# Patient Record
Sex: FEMALE | Race: WHITE | NOT HISPANIC OR LATINO | URBAN - METROPOLITAN AREA
[De-identification: names, ages, dates, MRNs, and addresses within clinical notes are randomized per-mention and may not be internally consistent; named-entity substitution may affect disease eponyms.]

---

## 2017-09-29 ENCOUNTER — APPOINTMENT (OUTPATIENT)
Age: 27
Setting detail: DERMATOLOGY
End: 2017-10-01

## 2017-09-29 VITALS — HEIGHT: 66 IN | WEIGHT: 150 LBS

## 2017-09-29 DIAGNOSIS — L71.8 OTHER ROSACEA: ICD-10-CM

## 2017-09-29 DIAGNOSIS — L57.8 OTHER SKIN CHANGES DUE TO CHRONIC EXPOSURE TO NONIONIZING RADIATION: ICD-10-CM

## 2017-09-29 PROBLEM — L29.8 OTHER PRURITUS: Status: ACTIVE | Noted: 2017-09-29

## 2017-09-29 PROBLEM — L30.9 DERMATITIS, UNSPECIFIED: Status: ACTIVE | Noted: 2017-09-29

## 2017-09-29 PROCEDURE — OTHER FOLLOW UP FOR NEXT VISIT: OTHER

## 2017-09-29 PROCEDURE — OTHER MIPS QUALITY: OTHER

## 2017-09-29 PROCEDURE — OTHER PRESCRIPTION: OTHER

## 2017-09-29 PROCEDURE — 99202 OFFICE O/P NEW SF 15 MIN: CPT

## 2017-09-29 PROCEDURE — OTHER TREATMENT REGIMEN: OTHER

## 2017-09-29 RX ORDER — DESONIDE 0.5 MG/G
THIN COAT CREAM TOPICAL DAILY
Qty: 1 | Refills: 1 | Status: ERX | COMMUNITY
Start: 2017-09-29 | End: 2017-10-27

## 2017-09-29 ASSESSMENT — LOCATION SIMPLE DESCRIPTION DERM
LOCATION SIMPLE: LEFT CHEEK
LOCATION SIMPLE: RIGHT CHEEK
LOCATION SIMPLE: LEFT FOREHEAD

## 2017-09-29 ASSESSMENT — LOCATION DETAILED DESCRIPTION DERM
LOCATION DETAILED: LEFT INFERIOR MEDIAL FOREHEAD
LOCATION DETAILED: RIGHT CENTRAL MALAR CHEEK
LOCATION DETAILED: LEFT INFERIOR CENTRAL MALAR CHEEK

## 2017-09-29 ASSESSMENT — SEVERITY ASSESSMENT OVERALL AMONG ALL PATIENTS
IN YOUR EXPERIENCE, AMONG ALL PATIENTS YOU HAVE SEEN WITH THIS CONDITION, HOW SEVERE IS THIS PATIENT'S CONDITION?: MILD TO MODERATE

## 2017-09-29 ASSESSMENT — LOCATION ZONE DERM: LOCATION ZONE: FACE

## 2017-09-29 NOTE — PROCEDURE: MIPS QUALITY
Quality 111:Pneumonia Vaccination Status For Older Adults: Pneumococcal Vaccination not Administered or Previously Received, Reason not Otherwise Specified
Quality 110: Preventive Care And Screening: Influenza Immunization: Influenza Immunization Ordered or Recommended, but not Administered due to system reason
Detail Level: Detailed
Quality 47: Advance Care Plan: Advance care planning not documented, reason not otherwise specified.

## 2017-09-29 NOTE — PROCEDURE: FOLLOW UP FOR NEXT VISIT
Scheduled For Follow Up In (Optional): 4-6 weeks
Detail Level: Simple
Instructions (Optional): I would like to see her back to check her progress. She will call if there are any problems with any of the medications

## 2017-09-29 NOTE — PROCEDURE: TREATMENT REGIMEN
Plan: She will need full body skin exam, I will discuss this with her at her follow up visit
Detail Level: Simple
Plan: There does appear to be a mixed component with both rosacea and possibly seborrheic dermatitis. Hopefully simplifying the lotion and face wash along with treatment for the rosacea will make a big difference. However, I am going to send in a prescription for desonide as well if she needs it for any flareups and then we will follow up in 4 to 6 weeks to see how she's doing
Samples Given: Soolantra QHS
Otc Regimen: Cerave Face wash and lotion only to simplify

## 2017-10-10 ENCOUNTER — RX ONLY (RX ONLY)
Age: 27
End: 2017-10-10

## 2017-10-10 RX ORDER — IVERMECTIN 10 MG/G
THIN COAT CREAM TOPICAL QHS
Qty: 1 | Refills: 2 | Status: ERX | COMMUNITY
Start: 2017-10-10

## 2017-10-27 ENCOUNTER — APPOINTMENT (OUTPATIENT)
Age: 27
Setting detail: DERMATOLOGY
End: 2017-10-28

## 2017-10-27 DIAGNOSIS — L71.8 OTHER ROSACEA: ICD-10-CM

## 2017-10-27 DIAGNOSIS — I78.8 OTHER DISEASES OF CAPILLARIES: ICD-10-CM

## 2017-10-27 DIAGNOSIS — L57.8 OTHER SKIN CHANGES DUE TO CHRONIC EXPOSURE TO NONIONIZING RADIATION: ICD-10-CM

## 2017-10-27 PROCEDURE — OTHER COUNSELING: OTHER

## 2017-10-27 PROCEDURE — OTHER TREATMENT REGIMEN: OTHER

## 2017-10-27 PROCEDURE — OTHER SUNSCREEN RECOMMENDATIONS: OTHER

## 2017-10-27 PROCEDURE — 99213 OFFICE O/P EST LOW 20 MIN: CPT

## 2017-10-27 PROCEDURE — OTHER FOLLOW UP FOR NEXT VISIT: OTHER

## 2017-10-27 ASSESSMENT — LOCATION SIMPLE DESCRIPTION DERM
LOCATION SIMPLE: LEFT CHEEK
LOCATION SIMPLE: RIGHT LIP
LOCATION SIMPLE: NOSE
LOCATION SIMPLE: RIGHT CHEEK

## 2017-10-27 ASSESSMENT — LOCATION DETAILED DESCRIPTION DERM
LOCATION DETAILED: NASAL DORSUM
LOCATION DETAILED: RIGHT INFERIOR CENTRAL MALAR CHEEK
LOCATION DETAILED: RIGHT LOWER CUTANEOUS LIP
LOCATION DETAILED: LEFT INFERIOR CENTRAL MALAR CHEEK
LOCATION DETAILED: LEFT CENTRAL MALAR CHEEK

## 2017-10-27 ASSESSMENT — LOCATION ZONE DERM
LOCATION ZONE: LIP
LOCATION ZONE: FACE
LOCATION ZONE: NOSE

## 2017-10-27 ASSESSMENT — SEVERITY ASSESSMENT OVERALL AMONG ALL PATIENTS
IN YOUR EXPERIENCE, AMONG ALL PATIENTS YOU HAVE SEEN WITH THIS CONDITION, HOW SEVERE IS THIS PATIENT'S CONDITION?: MINIMAL

## 2017-10-27 NOTE — PROCEDURE: TREATMENT REGIMEN
Detail Level: Simple
Samples Given: Rhofade and Mirvaso
Plan: Patient does have family history of skin cancer, she does get annual full body skin exams with her primary care physician. I encouraged her to continue these annually, she can follow up with me at any point
Plan: Call for prescription if she likes one of these products better than another. I did give her Dr. Denney’s business card to consider possible laser treatment at some point
Continue Regimen: Soolantra for 3-6 months
Detail Level: Generalized
Plan: Patient will continue the Soolantra for 3 to 6 months, at that time we may be able to see if she can decrease the frequency of dosing or stop it altogether. Regarding the redness I did give her samples both of Rhofade and Mirvaso today to try, she can call for a prescription if she likes one of them better than the other. I also gave her Dr. Denney’s card to discuss the possibility of laser treatment at some point for the redness and telangiectasias

## 2019-05-14 ENCOUNTER — APPOINTMENT (OUTPATIENT)
Age: 29
Setting detail: DERMATOLOGY
End: 2019-05-16

## 2019-05-14 VITALS — HEIGHT: 68 IN

## 2019-05-14 DIAGNOSIS — L21.8 OTHER SEBORRHEIC DERMATITIS: ICD-10-CM

## 2019-05-14 DIAGNOSIS — L71.8 OTHER ROSACEA: ICD-10-CM

## 2019-05-14 PROCEDURE — 99214 OFFICE O/P EST MOD 30 MIN: CPT

## 2019-05-14 PROCEDURE — OTHER PRESCRIPTION: OTHER

## 2019-05-14 PROCEDURE — OTHER TREATMENT REGIMEN: OTHER

## 2019-05-14 PROCEDURE — OTHER COUNSELING: OTHER

## 2019-05-14 PROCEDURE — OTHER FOLLOW UP FOR NEXT VISIT: OTHER

## 2019-05-14 RX ORDER — IVERMECTIN 10 MG/G
APPLY THIN COAT CREAM TOPICAL QHS
Qty: 1 | Refills: 3 | Status: ERX

## 2019-05-14 ASSESSMENT — LOCATION SIMPLE DESCRIPTION DERM
LOCATION SIMPLE: INFERIOR FOREHEAD
LOCATION SIMPLE: RIGHT CHEEK
LOCATION SIMPLE: LEFT CHEEK
LOCATION SIMPLE: LEFT LIP
LOCATION SIMPLE: NOSE

## 2019-05-14 ASSESSMENT — LOCATION DETAILED DESCRIPTION DERM
LOCATION DETAILED: LEFT LOWER CUTANEOUS LIP
LOCATION DETAILED: LEFT MEDIAL MALAR CHEEK
LOCATION DETAILED: INFERIOR MID FOREHEAD
LOCATION DETAILED: LEFT CENTRAL MALAR CHEEK
LOCATION DETAILED: RIGHT INFERIOR CENTRAL MALAR CHEEK
LOCATION DETAILED: NASAL DORSUM

## 2019-05-14 ASSESSMENT — SEVERITY ASSESSMENT: HOW SEVERE IS THIS PATIENT'S CONDITION?: MILD

## 2019-05-14 ASSESSMENT — LOCATION ZONE DERM
LOCATION ZONE: NOSE
LOCATION ZONE: LIP
LOCATION ZONE: FACE

## 2019-05-14 NOTE — PROCEDURE: FOLLOW UP FOR NEXT VISIT
Detail Level: Detailed
Detail Level: Simple
Scheduled For Follow Up In (Optional): 1 year
Scheduled For Follow Up In (Optional): prn

## 2019-05-14 NOTE — PROCEDURE: TREATMENT REGIMEN
Detail Level: Zone
Initiate Treatment: Soolantra
Plan: This is mild, hopefully the Soolantra will make a difference. If not she can call or follow up for further treatment recommendations
Detail Level: Simple
Continue Regimen: Soolantra 1% cream nightly
Plan: Patient will start using the Soolantra once daily at night for up to four months. At that point she can decrease the frequency of the dosing to see if she still needs it. If so she can simply continue using the medication. I also feel like this might help the small amount of seborrheic dermatitis that she pointed out in and around her nose. Follow up in two months if needed
Otc Regimen: Cerave hydrating cleanser and lotion

## 2023-05-31 ENCOUNTER — APPOINTMENT (OUTPATIENT)
Age: 33
Setting detail: DERMATOLOGY
End: 2023-06-01

## 2023-05-31 DIAGNOSIS — L21.8 OTHER SEBORRHEIC DERMATITIS: ICD-10-CM

## 2023-05-31 DIAGNOSIS — L71.8 OTHER ROSACEA: ICD-10-CM

## 2023-05-31 PROCEDURE — OTHER FOLLOW UP FOR NEXT VISIT: OTHER

## 2023-05-31 PROCEDURE — OTHER COUNSELING: OTHER

## 2023-05-31 PROCEDURE — 99213 OFFICE O/P EST LOW 20 MIN: CPT

## 2023-05-31 PROCEDURE — OTHER TREATMENT REGIMEN: OTHER

## 2023-05-31 PROCEDURE — OTHER PRESCRIPTION: OTHER

## 2023-05-31 PROCEDURE — OTHER COSMETIC CONSULTATION: IPL: OTHER

## 2023-05-31 RX ORDER — KETOCONAZOLE 20 MG/G
THIN COAT CREAM TOPICAL BID
Qty: 30 | Refills: 2 | Status: ERX | COMMUNITY
Start: 2023-05-31

## 2023-05-31 ASSESSMENT — LOCATION DETAILED DESCRIPTION DERM
LOCATION DETAILED: LEFT INFERIOR CENTRAL MALAR CHEEK
LOCATION DETAILED: NASAL DORSUM
LOCATION DETAILED: RIGHT INFERIOR CENTRAL MALAR CHEEK
LOCATION DETAILED: LEFT CENTRAL MALAR CHEEK
LOCATION DETAILED: LEFT MEDIAL MALAR CHEEK
LOCATION DETAILED: INFERIOR MID FOREHEAD

## 2023-05-31 ASSESSMENT — LOCATION SIMPLE DESCRIPTION DERM
LOCATION SIMPLE: NOSE
LOCATION SIMPLE: LEFT CHEEK
LOCATION SIMPLE: RIGHT CHEEK
LOCATION SIMPLE: INFERIOR FOREHEAD

## 2023-05-31 ASSESSMENT — SEVERITY ASSESSMENT: HOW SEVERE IS THIS PATIENT'S CONDITION?: MILD

## 2023-05-31 ASSESSMENT — LOCATION ZONE DERM
LOCATION ZONE: NOSE
LOCATION ZONE: FACE

## 2023-05-31 NOTE — PROCEDURE: TREATMENT REGIMEN
Plan: This is mild, hopefully the Ketoconazole will make a difference. She will also try the sample of Epsolay if she needs it.  If not she can call or follow up for further treatment recommendations
Detail Level: Zone
Detail Level: Simple
Discontinue Regimen: Ivermectin
Samples Given: Epsolay
Otc Regimen: Cerave hydrating cleanser and lotion
Initiate Treatment: Ketoconazole cream
Initiate Treatment: Ketoconazole cream, Epsolay
Plan: Patient will try the ketoconazole once or twice daily for 2 to 4 weeks. Shes more bothered by the scaling. If no improvement or minimal improvement, she will start using the Epsolay once daily. I do think she would benefit from IPL and we discussed partial face treatment. She will consider in schedule at a later date. We also briefly discussed vasoconstrictors like Rhofade, patient will consider and call back if she would like to start one of those medication‘s

## 2023-05-31 NOTE — PROCEDURE: FOLLOW UP FOR NEXT VISIT
Detail Level: Zone
Scheduled For Follow Up In (Optional): 2-3 months
Scheduled For Follow Up In (Optional): prn

## 2023-06-22 ENCOUNTER — APPOINTMENT (OUTPATIENT)
Age: 33
Setting detail: DERMATOLOGY
End: 2023-06-22

## 2023-06-22 DIAGNOSIS — I78.8 OTHER DISEASES OF CAPILLARIES: ICD-10-CM

## 2023-06-22 PROCEDURE — OTHER COUNSELING: OTHER

## 2023-06-22 PROCEDURE — OTHER TREATMENT REGIMEN: OTHER

## 2023-06-22 PROCEDURE — OTHER ALMA LASER: OTHER

## 2023-06-22 PROCEDURE — OTHER FOLLOW UP FOR NEXT VISIT: OTHER

## 2023-06-22 ASSESSMENT — LOCATION SIMPLE DESCRIPTION DERM
LOCATION SIMPLE: RIGHT CHEEK
LOCATION SIMPLE: LEFT CHEEK

## 2023-06-22 ASSESSMENT — LOCATION ZONE DERM: LOCATION ZONE: FACE

## 2023-06-22 ASSESSMENT — LOCATION DETAILED DESCRIPTION DERM
LOCATION DETAILED: RIGHT SUPERIOR CENTRAL MALAR CHEEK
LOCATION DETAILED: LEFT SUPERIOR CENTRAL MALAR CHEEK

## 2023-06-22 NOTE — PROCEDURE: ALMA LASER
Er:Yag Post-Care Nail Fungus: Post care reviewed with patient.
Treatment Number: 1
Energy (Mj/P): 600
Fluence (J/Cm2): 8
Frequency In Hz: Unibody
Fluence (J/Cm2): 35
Consent: Written consent obtained, risks reviewed including but not limited to crusting, scabbing, blistering, scarring, darker or lighter pigmentary change, systemic reactions, ulceration, incidental hair removal, bruising, and/or incomplete removal.
Nd:Yag Post-Care: Immediate endpoint whitening and pinpoint bleeding. Vaseline and ice applied. Post care reviewed with patient.
Ipl Additional Treatment Information: Started with PW of 12 and Fluence of 10, Cooling 75% but patient was having increased sensitivity, so we change the settings to: PW 15, Fluence of 9 and Cooling 100%. Patient tolerated the procedure well. She had some increased redness, but no major issues. Final corrective, calming serum and mineral based on screen was applied to the face. She will follow up in one month for her second treatment.  Today only one pass was performed the entire face.
Post-Care To Use?: Generic
Pulse Width: 30 ms
Pulse Duration In Ms: 15
External Cooling Fan Speed: 5
Repetition Rate (Hz): 3
Post-Care Instructions: I reviewed with the patient in detail post-care instructions. Patient should avoid sunlight and wear sun protection.
Pulse Mode (Optional): L
Detail Level: Simple
Energy In Mj: 400
Total Energy (Kj): 6.1
Price (Use Numbers Only, No Special Characters Or $): 300
Plasma Intensity (%): 25
Energy In Kj: 50
Ipl Post-Care: Phytocorrective serum and Mineral SS applied. Post care reviewed with patient.
Clear Lift Post-Care: Post care reviewed with patient. Patient should avoid direct sunlight and wear broad spectrum sunscreen daily.
Fluence (Mj/Cm2): 4

## 2023-06-22 NOTE — PROCEDURE: TREATMENT REGIMEN
Detail Level: Detailed
Initiate Treatment: Dye VL
Plan: Patient tolerated her first laser procedure. She will follow up in 1 month for #2 treatment

## 2023-06-22 NOTE — PROCEDURE: FOLLOW UP FOR NEXT VISIT
Scheduled For Follow Up In (Optional): 1 month
Detail Level: Simple
Other Lasers/Thermal Treatments: IPL #2

## 2023-06-22 NOTE — HPI: PROMINENT VEINS (TELANGIECTASIA)
Is This A New Presentation, Or A Follow-Up?: Prominent Veins
How Severe Are They?: moderate
Additional History: Patient presents for her first [place] [facility] treatment.

## 2023-06-22 NOTE — PROCEDURE: COUNSELING
Gage Brasher MD made aware. 500 cc NS bolus ordered. Will cont to monitor.
Detail Level: Zone
Patient Specific Counseling (Will Not Stick From Patient To Patient): We discussed the risks and benefits of intense pulsed light treatment including but not limited to lightening or darkening of treated skin, crusting, scabbing, scarring, reactivation of cold sores, infection, redness,  accidental hair removal, swelling and bruising. Strict sun avoidance emphasized. Patient understands that multiple treatments may be necessary, and not all unwanted brown spots can be removed. Patient aware of alternatives to IPL, including q switched lasers, bleaching creams, chemical peels, retinoids, cryotherapy, and fraxel. Patient understands that the treatment is cosmetic in nature and not covered by insurance.

## 2023-07-20 ENCOUNTER — APPOINTMENT (OUTPATIENT)
Age: 33
Setting detail: DERMATOLOGY
End: 2023-07-20

## 2023-07-20 DIAGNOSIS — I78.8 OTHER DISEASES OF CAPILLARIES: ICD-10-CM

## 2023-07-20 PROCEDURE — OTHER TREATMENT REGIMEN: OTHER

## 2023-07-20 PROCEDURE — OTHER ALMA LASER: OTHER

## 2023-07-20 PROCEDURE — OTHER FOLLOW UP FOR NEXT VISIT: OTHER

## 2023-07-20 PROCEDURE — OTHER COUNSELING: OTHER

## 2023-07-20 ASSESSMENT — LOCATION SIMPLE DESCRIPTION DERM
LOCATION SIMPLE: RIGHT CHEEK
LOCATION SIMPLE: LEFT CHEEK

## 2023-07-20 ASSESSMENT — LOCATION ZONE DERM: LOCATION ZONE: FACE

## 2023-07-20 NOTE — PROCEDURE: COUNSELING
Detail Level: Zone
Patient Specific Counseling (Will Not Stick From Patient To Patient): We discussed the risks and benefits of intense pulsed light treatment including but not limited to lightening or darkening of treated skin, crusting, scabbing, scarring, reactivation of cold sores, infection, redness,  accidental hair removal, swelling and bruising. Strict sun avoidance emphasized. Patient understands that multiple treatments may be necessary, and not all unwanted brown spots can be removed. Patient aware of alternatives to IPL, including q switched lasers, bleaching creams, chemical peels, retinoids, cryotherapy, and fraxel. Patient understands that the treatment is cosmetic in nature and not covered by insurance.

## 2023-07-20 NOTE — PROCEDURE: TREATMENT REGIMEN
Detail Level: Detailed
Plan: Dye VL #2. Patient did well, tolerated with minimal discomfort. I did apply the final, corrective serum, and the mineral based sunscreen prior to her leaving. We did do a combination of in motion to the whole face as well as two passes with stamping with different settings.  In Motion whole face: 30 s passes, 100% cooling, 3J, 4.0 kJ total for whole face. Stamping #1: 8J, 100% cooling, PW 15. Stamping #2: Forehead stayed at 8J, rest of face at 6J.

## 2023-07-20 NOTE — PROCEDURE: ALMA LASER
Matrix Dots: 7 x 7
Pulse Width: 30 ms
Treatment Number: 1
Opus Post-Care: Post care reviewed with patient.
Ipl Post-Care: Phytocorrective serum and Mineral SS applied. Post care reviewed with patient.
Pulse Mode (Optional): L
Total Energy (Kj): 6.1
Handpiece: Cabify Pro 5
Pulse Type: I
Passes (Optional): 2
Price (Use Numbers Only, No Special Characters Or $): 820
Energy (Mj/P): 600
Power (Stinson): Low (10w)
Detail Level: Simple
Consent: Written consent obtained, risks reviewed including but not limited to crusting, scabbing, blistering, scarring, darker or lighter pigmentary change, systemic reactions, ulceration, incidental hair removal, bruising, and/or incomplete removal.
External Cooling Fan Speed: 5
Post-Care Instructions: I reviewed with the patient in detail post-care instructions. Patient should avoid sunlight and wear sun protection.
Plasma Intensity (%): 25
Pulse Duration In Ms: 15
Halie Post-Care: Immediate endpoint whitening and pinpoint bleeding. Vaseline and ice applied. Post care reviewed with patient.
Clear Lift Post-Care: Post care reviewed with patient. Patient should avoid direct sunlight and wear broad spectrum sunscreen daily.
Handpiece: Bipolar
Fluence (J/Cm2): 8
Fluence (Mj/Cm2): 4
Repetition Rate (Hz): 3
Frequency In Hz: Unibody
Fluence (J/Cm2): 35
Post-Care To Use?: Generic
Energy In Mj: 400

## 2023-07-20 NOTE — PROCEDURE: FOLLOW UP FOR NEXT VISIT
Scheduled For Follow Up In (Optional): 1 month
Detail Level: Simple
Other Lasers/Thermal Treatments: IPL #3

## 2023-08-18 ENCOUNTER — APPOINTMENT (OUTPATIENT)
Age: 33
Setting detail: DERMATOLOGY
End: 2023-08-26

## 2023-08-18 DIAGNOSIS — I78.8 OTHER DISEASES OF CAPILLARIES: ICD-10-CM

## 2023-08-18 PROCEDURE — OTHER TREATMENT REGIMEN: OTHER

## 2023-08-18 PROCEDURE — OTHER FOLLOW UP FOR NEXT VISIT: OTHER

## 2023-08-18 PROCEDURE — OTHER COUNSELING: OTHER

## 2023-08-18 PROCEDURE — OTHER ALMA LASER: OTHER

## 2023-08-18 ASSESSMENT — LOCATION ZONE DERM: LOCATION ZONE: FACE

## 2023-08-18 ASSESSMENT — LOCATION SIMPLE DESCRIPTION DERM
LOCATION SIMPLE: RIGHT CHEEK
LOCATION SIMPLE: LEFT CHEEK

## 2023-08-18 NOTE — PROCEDURE: FOLLOW UP FOR NEXT VISIT
Detail Level: Simple
Scheduled For Follow Up In (Optional): 1 month
Other Lasers/Thermal Treatments: IPL # 4

## 2023-08-18 NOTE — PROCEDURE: ALMA LASER
Number Of Pulses: 1
Er:Yag Post-Care Tattoo: Post care reviewed with patient.
Pulse Type: I
Passes (Optional): 2
Price (Use Numbers Only, No Special Characters Or $): 408
Handpiece: Data Elite Pro 5
Energy (Mj/P): 600
Power (Stinson): Low (10w)
Consent: Written consent obtained, risks reviewed including but not limited to crusting, scabbing, blistering, scarring, darker or lighter pigmentary change, systemic reactions, ulceration, incidental hair removal, bruising, and/or incomplete removal.
Detail Level: Simple
External Cooling Fan Speed: 5
Plasma Intensity (%): 25
Pulse Duration In Ms: 15
Halie Post-Care: Immediate endpoint whitening and pinpoint bleeding. Vaseline and ice applied. Post care reviewed with patient.
Clear Lift Post-Care: Post care reviewed with patient. Patient should avoid direct sunlight and wear broad spectrum sunscreen daily.
Fluence (J/Cm2): 8
Frequency In Hz: Unibody
Handpiece: Bipolar
Repetition Rate (Hz): 3
Post-Care To Use?: Generic
Post-Care Instructions: I reviewed with the patient in detail post-care instructions. Patient should avoid sunlight and wear sun protection.
Fluence (Mj/Cm2): 4
Energy In Mj: 400
Fluence (J/Cm2): 35
Pulse Width: 30 ms
Total Energy (Kj): 6.1
Ipl Post-Care: Phytocorrective serum and Mineral SS applied. Post care reviewed with patient.
Pulse Mode (Optional): L
Matrix Dots: 7 x 7

## 2023-08-18 NOTE — PROCEDURE: TREATMENT REGIMEN
Plan: Dye VL # 3. Patient did well, tolerated with minimal discomfort. I did apply the phyto corrective serum, and the mineral based sunscreen prior to her leaving. We did do a combination of in motion to the whole face as well as two passes with stamping with different settings.  In Motion whole face: 30 s passes, 100% cooling, 3.4 J, 4.0 kJ total for whole face. Stamping #1: 8J, 100% cooling, PW 15. Stamping #2: Forehead stayed at 8J, rest of face at 6J.  \\n\\nDepending on how she does after this treatment, she may follow up in 4 to 6 weeks or she may hold off a little longer to do her fourth and final IPL treatment
Detail Level: Zone

## 2023-12-13 ENCOUNTER — APPOINTMENT (OUTPATIENT)
Age: 33
Setting detail: DERMATOLOGY
End: 2023-12-14

## 2023-12-13 DIAGNOSIS — I78.8 OTHER DISEASES OF CAPILLARIES: ICD-10-CM

## 2023-12-13 PROCEDURE — OTHER ALMA LASER: OTHER

## 2023-12-13 PROCEDURE — OTHER TREATMENT REGIMEN: OTHER

## 2023-12-13 PROCEDURE — OTHER FOLLOW UP FOR NEXT VISIT: OTHER

## 2023-12-13 PROCEDURE — OTHER COUNSELING: OTHER

## 2023-12-13 ASSESSMENT — LOCATION DETAILED DESCRIPTION DERM
LOCATION DETAILED: LEFT CHIN
LOCATION DETAILED: RIGHT LOWER CUTANEOUS LIP
LOCATION DETAILED: LEFT FOREHEAD
LOCATION DETAILED: LEFT LOWER CUTANEOUS LIP
LOCATION DETAILED: RIGHT SUPERIOR CENTRAL MALAR CHEEK
LOCATION DETAILED: LEFT SUPERIOR CENTRAL MALAR CHEEK
LOCATION DETAILED: NASAL SUPRATIP
LOCATION DETAILED: RIGHT UPPER CUTANEOUS LIP
LOCATION DETAILED: LEFT UPPER CUTANEOUS LIP
LOCATION DETAILED: RIGHT FOREHEAD

## 2023-12-13 ASSESSMENT — LOCATION SIMPLE DESCRIPTION DERM
LOCATION SIMPLE: LEFT FOREHEAD
LOCATION SIMPLE: RIGHT FOREHEAD
LOCATION SIMPLE: RIGHT CHEEK
LOCATION SIMPLE: RIGHT LIP
LOCATION SIMPLE: LEFT CHEEK
LOCATION SIMPLE: LEFT LIP
LOCATION SIMPLE: CHIN
LOCATION SIMPLE: NOSE

## 2023-12-13 ASSESSMENT — LOCATION ZONE DERM
LOCATION ZONE: FACE
LOCATION ZONE: LIP
LOCATION ZONE: NOSE

## 2023-12-13 NOTE — PROCEDURE: ALMA LASER
Halie Post-Care: Immediate endpoint whitening and pinpoint bleeding. Vaseline and ice applied. Post care reviewed with patient.
Pulse Duration In Ms: 15
Treatment Number: 1
 Post-Care: Post care reviewed with patient.
Clear Lift Post-Care: Post care reviewed with patient. Patient should avoid direct sunlight and wear broad spectrum sunscreen daily.
Plasma Intensity (%): 25
Handpiece: Bipolar
Fluence (J/Cm2): 8
Treatment Number: 4
Post-Care Instructions: I reviewed with the patient in detail post-care instructions. Patient should avoid sunlight and wear sun protection.
Frequency In Hz: Unibody
Post-Care To Use?: Generic
Repetition Rate (Hz): 3
Energy In Mj: 400
Matrix Dots: 7 x 7
Fluence (J/Cm2): 35
Ipl Post-Care: Phytocorrective serum and Mineral SS applied. Post care reviewed with patient.
Pulse Width: 30 ms
Total Energy (Kj): 6.1
Frequency In Hz: 2
Price (Use Numbers Only, No Special Characters Or $): 806
Consent: Written consent obtained, risks reviewed including but not limited to crusting, scabbing, blistering, scarring, darker or lighter pigmentary change, systemic reactions, ulceration, incidental hair removal, bruising, and/or incomplete removal.
Detail Level: Simple
Pulse Type: I
External Cooling Fan Speed: 5
Handpiece: [employer]ware[employer] Pro 5
Power (Stinson): Low (10w)
Pulse Mode (Optional): L
Energy (Mj/P): 600

## 2023-12-13 NOTE — PROCEDURE: COUNSELING
Patient Specific Counseling (Will Not Stick From Patient To Patient): We discussed the risks and benefits of intense pulsed light treatment including but not limited to lightening or darkening of treated skin, crusting, scabbing, scarring, reactivation of cold sores, infection, redness,  accidental hair removal, swelling and bruising. Strict sun avoidance emphasized. Patient understands that multiple treatments may be necessary, and not all unwanted brown spots can be removed. Patient aware of alternatives to IPL, including q switched lasers, bleaching creams, chemical peels, retinoids, cryotherapy, and fraxel. Patient understands that the treatment is cosmetic in nature and not covered by insurance.
Detail Level: Zone

## 2023-12-13 NOTE — PROCEDURE: FOLLOW UP FOR NEXT VISIT
Detail Level: Simple
Scheduled For Follow Up In (Optional): 6 weeks
Other Lasers/Thermal Treatments: IPL # 5

## 2023-12-13 NOTE — PROCEDURE: TREATMENT REGIMEN
Detail Level: Zone
Plan: Dye VL # 4.  Patient did tolerate the procedure well, although she felt some increased sensitivity due to the settings. My attempt was to try to increase the improvement by making the settings slightly more aggressive. I did tell the patient she can come back for a follow up treatment for no charge\\n\\Zoila Motion, right and left cheek, chin.  Stamping, forehead, right and left cheeks, nose, upper lip and chin.\\n\\Zoila Motion, Right cheek and Right chin: started with Fluence of 4J, but too painful. Decreased to Fluence of 3.6, 2 session x 30s, Cooling 100%, Total: 1.9 kJ\\n\\Zoila Motion, Left cheek and Left chin: Fluence 3.6J, Cooling 100%, Total: 1.9 kJ\\n\\nStamping, Forehead/Right and Left cheeks, 2 passes: Fluence 9J, PW 12, Cooling 100%. Patient tolerated well\\n\\nStamping, Nose, Upper lip and Chin, 1 pass: Fluence 9J, PW 12. Cooling 100%

## 2025-04-15 ENCOUNTER — APPOINTMENT (OUTPATIENT)
Dept: URBAN - METROPOLITAN AREA CLINIC 301 | Age: 35
Setting detail: DERMATOLOGY
End: 2025-04-15

## 2025-04-15 VITALS — HEIGHT: 66 IN | WEIGHT: 140 LBS

## 2025-04-15 DIAGNOSIS — Z12.83 ENCOUNTER FOR SCREENING FOR MALIGNANT NEOPLASM OF SKIN: ICD-10-CM

## 2025-04-15 DIAGNOSIS — L50.3 DERMATOGRAPHIC URTICARIA: ICD-10-CM

## 2025-04-15 DIAGNOSIS — Z71.89 OTHER SPECIFIED COUNSELING: ICD-10-CM

## 2025-04-15 DIAGNOSIS — L81.4 OTHER MELANIN HYPERPIGMENTATION: ICD-10-CM

## 2025-04-15 DIAGNOSIS — D18.0 HEMANGIOMA: ICD-10-CM

## 2025-04-15 DIAGNOSIS — K11.6 MUCOCELE OF SALIVARY GLAND: ICD-10-CM

## 2025-04-15 DIAGNOSIS — L82.1 OTHER SEBORRHEIC KERATOSIS: ICD-10-CM

## 2025-04-15 DIAGNOSIS — L50.1 IDIOPATHIC URTICARIA: ICD-10-CM

## 2025-04-15 DIAGNOSIS — D22 MELANOCYTIC NEVI: ICD-10-CM

## 2025-04-15 DIAGNOSIS — L72.8 OTHER FOLLICULAR CYSTS OF THE SKIN AND SUBCUTANEOUS TISSUE: ICD-10-CM

## 2025-04-15 PROBLEM — D22.5 MELANOCYTIC NEVI OF TRUNK: Status: ACTIVE | Noted: 2025-04-15

## 2025-04-15 PROBLEM — D18.01 HEMANGIOMA OF SKIN AND SUBCUTANEOUS TISSUE: Status: ACTIVE | Noted: 2025-04-15

## 2025-04-15 PROCEDURE — OTHER COUNSELING: OTHER

## 2025-04-15 PROCEDURE — 99213 OFFICE O/P EST LOW 20 MIN: CPT

## 2025-04-15 PROCEDURE — OTHER REASSURANCE: OTHER

## 2025-04-15 PROCEDURE — OTHER PRESCRIPTION MEDICATION MANAGEMENT: OTHER

## 2025-04-15 PROCEDURE — OTHER PRESCRIPTION: OTHER

## 2025-04-15 PROCEDURE — OTHER MIPS QUALITY: OTHER

## 2025-04-15 RX ORDER — LEVOCETIRIZINE DIHYDROCHLORIDE 5 MG/1
TABLET, FILM COATED ORAL
Qty: 30 | Refills: 0 | Status: ERX | COMMUNITY
Start: 2025-04-15

## 2025-04-15 ASSESSMENT — LOCATION SIMPLE DESCRIPTION DERM
LOCATION SIMPLE: RIGHT INFERIOR LIP
LOCATION SIMPLE: UPPER BACK
LOCATION SIMPLE: RIGHT CHEEK
LOCATION SIMPLE: ANUS
LOCATION SIMPLE: CHEST

## 2025-04-15 ASSESSMENT — LOCATION DETAILED DESCRIPTION DERM
LOCATION DETAILED: RIGHT MEDIAL SUPERIOR CHEST
LOCATION DETAILED: UPPER STERNUM
LOCATION DETAILED: LEFT LATERAL SUPERIOR CHEST
LOCATION DETAILED: RIGHT INFERIOR VERMILION LIP
LOCATION DETAILED: SUPERIOR THORACIC SPINE
LOCATION DETAILED: RIGHT CENTRAL MALAR CHEEK
LOCATION DETAILED: LEFT PERIANAL SKIN

## 2025-04-15 ASSESSMENT — LOCATION ZONE DERM
LOCATION ZONE: ANUS
LOCATION ZONE: TRUNK
LOCATION ZONE: LIP
LOCATION ZONE: FACE

## 2025-04-15 NOTE — HPI: RASH
What Type Of Note Output Would You Prefer (Optional)?: Bullet Format
Is This A New Presentation, Or A Follow-Up?: Rash
Additional History: Pt states she has been getting hives lately.

## 2025-04-15 NOTE — PROCEDURE: PRESCRIPTION MEDICATION MANAGEMENT
Render In Strict Bullet Format?: No
Detail Level: Simple
Initiate Treatment: levocetirizine 5 mg tablet \\nQuantity: 30.0 Tablet  Days Supply: 30\\nSig: Take 1 po qd